# Patient Record
Sex: FEMALE | URBAN - NONMETROPOLITAN AREA
[De-identification: names, ages, dates, MRNs, and addresses within clinical notes are randomized per-mention and may not be internally consistent; named-entity substitution may affect disease eponyms.]

---
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
## 2025-04-09 NOTE — PROCEDURE: COUNSELING
Orders:    lisinopril 20 mg tablet; Take 1 tablet (20 mg) by mouth once daily.     Detail Level: Generalized